# Patient Record
Sex: MALE | Race: WHITE | NOT HISPANIC OR LATINO | Employment: OTHER | URBAN - METROPOLITAN AREA
[De-identification: names, ages, dates, MRNs, and addresses within clinical notes are randomized per-mention and may not be internally consistent; named-entity substitution may affect disease eponyms.]

---

## 2024-03-20 DIAGNOSIS — R10.9 FLANK PAIN: ICD-10-CM

## 2024-03-21 ENCOUNTER — OFFICE VISIT (OUTPATIENT)
Dept: UROLOGY | Facility: CLINIC | Age: 59
End: 2024-03-21
Payer: MEDICARE

## 2024-03-21 ENCOUNTER — HOSPITAL ENCOUNTER (OUTPATIENT)
Dept: RADIOLOGY | Facility: CLINIC | Age: 59
Discharge: HOME | End: 2024-03-21
Payer: MEDICARE

## 2024-03-21 VITALS — BODY MASS INDEX: 23.23 KG/M2 | WEIGHT: 148 LBS | RESPIRATION RATE: 16 BRPM | HEIGHT: 67 IN

## 2024-03-21 DIAGNOSIS — R10.9 FLANK PAIN: ICD-10-CM

## 2024-03-21 DIAGNOSIS — R35.1 NOCTURIA: ICD-10-CM

## 2024-03-21 DIAGNOSIS — Z87.442 HISTORY OF KIDNEY STONES: ICD-10-CM

## 2024-03-21 LAB
POC APPEARANCE, URINE: CLEAR
POC BILIRUBIN, URINE: NEGATIVE
POC BLOOD, URINE: NEGATIVE
POC GLUCOSE, URINE: NEGATIVE MG/DL
POC KETONES, URINE: NEGATIVE MG/DL
POC LEUKOCYTES, URINE: NEGATIVE
POC NITRITE,URINE: NEGATIVE
POC PH, URINE: 6 PH
POC PROTEIN, URINE: NEGATIVE MG/DL
POC SPECIFIC GRAVITY, URINE: >=1.03
POC UROBILINOGEN, URINE: 0.2 EU/DL

## 2024-03-21 PROCEDURE — 74018 RADEX ABDOMEN 1 VIEW: CPT | Performed by: RADIOLOGY

## 2024-03-21 PROCEDURE — 81003 URINALYSIS AUTO W/O SCOPE: CPT | Performed by: UROLOGY

## 2024-03-21 PROCEDURE — 99214 OFFICE O/P EST MOD 30 MIN: CPT | Performed by: UROLOGY

## 2024-03-21 PROCEDURE — 74018 RADEX ABDOMEN 1 VIEW: CPT

## 2024-03-21 PROCEDURE — 4004F PT TOBACCO SCREEN RCVD TLK: CPT | Performed by: UROLOGY

## 2024-03-21 RX ORDER — FAMOTIDINE 40 MG/1
40 TABLET, FILM COATED ORAL
COMMUNITY
Start: 2023-05-03

## 2024-03-21 RX ORDER — EPINEPHRINE 0.3 MG/.3ML
INJECTION SUBCUTANEOUS
COMMUNITY
Start: 2023-06-15

## 2024-03-21 RX ORDER — LEVOCETIRIZINE DIHYDROCHLORIDE 5 MG/1
5 TABLET, FILM COATED ORAL
COMMUNITY
Start: 2023-05-03

## 2024-03-21 RX ORDER — OLOPATADINE HYDROCHLORIDE 1 MG/ML
1 SOLUTION/ DROPS OPHTHALMIC 2 TIMES DAILY PRN
COMMUNITY
Start: 2023-09-21 | End: 2024-09-20

## 2024-03-21 RX ORDER — MONTELUKAST SODIUM 10 MG/1
10 TABLET ORAL
COMMUNITY
Start: 2023-11-02 | End: 2024-04-30

## 2024-03-21 RX ORDER — DICLOFENAC SODIUM 10 MG/G
GEL TOPICAL
COMMUNITY
Start: 2023-04-25

## 2024-03-21 RX ORDER — MELATONIN 10 MG
TABLET, SUBLINGUAL SUBLINGUAL
COMMUNITY

## 2024-03-21 RX ORDER — CELECOXIB 200 MG/1
200 CAPSULE ORAL
COMMUNITY
Start: 2023-05-04

## 2024-03-21 ASSESSMENT — ENCOUNTER SYMPTOMS
PSYCHIATRIC NEGATIVE: 1
DIFFICULTY URINATING: 0
COUGH: 0
CHILLS: 0
FEVER: 0
ENDOCRINE NEGATIVE: 1
NAUSEA: 0
EYES NEGATIVE: 1
SHORTNESS OF BREATH: 0
ALLERGIC/IMMUNOLOGIC NEGATIVE: 1

## 2024-03-21 NOTE — PROGRESS NOTES
Subjective   Patient ID: Luis Sevilla II is a 59 y.o. male.    HPI  Patient is here for flank pain. He states pain is on left side and this started on Tuesday. He states he tried using ice which  was helpful. He did run a low grade fever last night. . Hx of kidney stones.  Chronic BPH sx are mild and stable. Denies urgency and frequency. Denies dysuria. Denies hematuria. Nocturia x1. No medication for LUT'S. Most recent PSA was 0.91 on 12/22. ED is mild.       Review of Systems   Constitutional:  Negative for chills and fever.   HENT: Negative.     Eyes: Negative.    Respiratory:  Negative for cough and shortness of breath.    Cardiovascular:  Negative for chest pain and leg swelling.   Gastrointestinal:  Negative for nausea.   Endocrine: Negative.    Genitourinary:  Negative for difficulty urinating.        Negative except for documented in HPI   Allergic/Immunologic: Negative.    Neurological:         Alert & oriented X 3   Hematological:         Denies blood thinners   Psychiatric/Behavioral: Negative.         Objective   Physical Exam  Vitals and nursing note reviewed.   Constitutional:       General: He is not in acute distress.     Appearance: Normal appearance.   Pulmonary:      Effort: Pulmonary effort is normal.   Abdominal:      Tenderness: There is no abdominal tenderness.   Genitourinary:     Comments: Kidneys non palpable bilaterally  Bladder non palpable or tender  Scrotum no mass, No hydrocele  Epididymis- No spermatocele. Non Tender.  Testicles: No mass  Urethra: No discharge  Penis within normal limits... No lesions  Prostate - symmetric, no nodules  Seminal Vesicals: No mass.  Sphincter tone: normal  Neurological:      Mental Status: He is alert.         Assessment/Plan   Diagnoses and all orders for this visit:  Nocturia  History of kidney stones  Flank pain      All available PSA values reviewed, Options discussed. Questions answered.  PSA ordered   Diet changes for prostate health discussed and  educational information given. Pros/Cons of prostate health supplements discussed.   Treatment options for LUTS reviewed  UA ordered  Discussed timed voiding. Discussed fluid and caffeine intake  Treatment options for ED reviewed.  Lifestyle change to help prevent UTIs discussed. Encouraged fluid intake.  KUB reviewed  Renal U/S ordered  Stone prevention discussed. Diet reviewed. Discussed fluid intake      F/U  after U/S and PSA virtual

## 2024-03-26 ENCOUNTER — HOSPITAL ENCOUNTER (OUTPATIENT)
Dept: RADIOLOGY | Facility: CLINIC | Age: 59
Discharge: HOME | End: 2024-03-26
Payer: MEDICARE

## 2024-03-26 ENCOUNTER — LAB (OUTPATIENT)
Dept: LAB | Facility: LAB | Age: 59
End: 2024-03-26
Payer: MEDICARE

## 2024-03-26 DIAGNOSIS — R10.9 FLANK PAIN: ICD-10-CM

## 2024-03-26 DIAGNOSIS — R35.1 NOCTURIA: ICD-10-CM

## 2024-03-26 LAB — PSA SERPL-MCNC: 1.86 NG/ML

## 2024-03-26 PROCEDURE — 76770 US EXAM ABDO BACK WALL COMP: CPT | Performed by: RADIOLOGY

## 2024-03-26 PROCEDURE — 36415 COLL VENOUS BLD VENIPUNCTURE: CPT

## 2024-03-26 PROCEDURE — 84153 ASSAY OF PSA TOTAL: CPT

## 2024-03-26 PROCEDURE — 76770 US EXAM ABDO BACK WALL COMP: CPT

## 2024-04-02 ASSESSMENT — ENCOUNTER SYMPTOMS
PSYCHIATRIC NEGATIVE: 1
CHILLS: 0
FEVER: 0
ENDOCRINE NEGATIVE: 1
SHORTNESS OF BREATH: 0
DIFFICULTY URINATING: 0
ALLERGIC/IMMUNOLOGIC NEGATIVE: 1
COUGH: 0
EYES NEGATIVE: 1
NAUSEA: 0

## 2024-04-02 NOTE — PROGRESS NOTES
Virtual or Telephone Consent    An interactive audio and video telecommunication system which permits real time communications between the patient (at the originating site) and provider (at the distant site) was utilized to provide this telehealth service.   Verbal consent was requested and obtained from Luis Sevilla II on this date, 04/08/24 for a telehealth visit.     Subjective   Patient ID: Luis Sevilla II is a 59 y.o. male.    HPI  Patient is here for Renal US and PSA results. He has been having Left flank pain. Recent US showed nononobstructing bilateral renal stones. Hx of kidney stones.  Chronic BPH sx are mild and stable. Denies urgency and frequency. Denies dysuria. Denies hematuria. Nocturia x1. No medication for LUT'S. Most recent PSA was 1.86 on 3/24. Prior PSA was  0.91 on 12/22. ED is mild.       Review of Systems   Constitutional:  Negative for chills and fever.   HENT: Negative.     Eyes: Negative.    Respiratory:  Negative for cough and shortness of breath.    Cardiovascular:  Negative for chest pain and leg swelling.   Gastrointestinal:  Negative for nausea.   Endocrine: Negative.    Genitourinary:  Negative for difficulty urinating.        Negative except for documented in HPI   Allergic/Immunologic: Negative.    Neurological:         Alert & oriented X 3   Hematological:         Denies blood thinners   Psychiatric/Behavioral: Negative.         Objective   Physical Exam  No PE done given the virtual nature of visit.   Assessment/Plan   There are no diagnoses linked to this encounter.    All available PSA values reviewed, Options discussed. Questions answered.   Diet changes for prostate health discussed and educational information given. Pros/Cons of prostate health supplements discussed.   Treatment options for LUTS reviewed  Discussed timed voiding. Discussed fluid and caffeine intake  Treatment options for ED reviewed.  Lifestyle change to help prevent UTIs discussed. Encouraged fluid intake.  U/S  reviewed  KUB reviewed  Stone prevention discussed. Diet reviewed. Discussed fluid intake      F/U  6 months with KUB

## 2024-04-08 ENCOUNTER — TELEMEDICINE (OUTPATIENT)
Dept: UROLOGY | Facility: CLINIC | Age: 59
End: 2024-04-08
Payer: MEDICARE

## 2024-04-08 DIAGNOSIS — Z87.442 HISTORY OF KIDNEY STONES: Primary | ICD-10-CM

## 2024-04-08 PROCEDURE — 99213 OFFICE O/P EST LOW 20 MIN: CPT | Performed by: UROLOGY
